# Patient Record
(demographics unavailable — no encounter records)

---

## 2025-05-21 NOTE — HISTORY OF PRESENT ILLNESS
[Chills] : no chills [Constipation] : no constipation [Diarrhea] : no diarrhea [Dysuria] : no dysuria [Fever] : no fever [Nausea] : no nausea [Vomiting] : no vomiting [Erythema] : not erythematous [Discharge] : absent of discharge [Dehiscence] : not dehisced [de-identified] : Patient presents today for F/U S/P [Left TKR done 3 weeks ago. Patient is doing well and undergoing P.T. with improvement. Takes pain meds and DVT prophylaxis. I went over post-op care and answered all questions. I also provided patient with surgical report for their records. [de-identified] : ROM 0-95 degrees [de-identified] : Continue P.T. pain management and DVT prophylaxis. F/U in 1 month with x-rays.

## 2025-05-21 NOTE — PROCEDURE
[de-identified] : Observation on incision dry, clean, intact, well healed. Method staple removing kit. Suture site Cleaned with iodine swab after sutures are completely removed. Instructions Keep incision dry and clean, allowed to shower and pat site dry, do not rub dry, contact office is site becomes red, swollen, infected, or you develop a fever.

## 2025-06-18 NOTE — HISTORY OF PRESENT ILLNESS
[de-identified] : REENA SHEEHAN  62 year old male presents for evaluation of 7 week s/p left TKR. He continues to do PT 2x a week and goes to the gym 4x a week. He has returned to work and does experience swelling, tightness, and warmth of the knee and does ice it. The pt does have question regarding his limitation at the gym for leg press, squats, etc. He will be travelling to [place] and was instructed on DVT prophylaxis and inquiries about going to the beach and water as well as skiing in the winter. The pt does require shoulder replacement later this yr. His preop ROM was 5-100.

## 2025-06-18 NOTE — DISCUSSION/SUMMARY
[de-identified] : Patient is doing well, is happy, and making good progress following their s/p Left TKR. He was stiff preoperatively but has regained the motion and is improving. I reviewed x-rays with them. I have reassured them that their implants are functioning well. All questions answered, understanding verbalized.   He is encouraged to continue with PT and activities as tolerated.   I will see them back in 6-8 weeks.

## 2025-06-18 NOTE — PHYSICAL EXAM
[de-identified] : General appearance: well-nourished and hydrated, pleasant, alert and oriented x 3, cooperative. HEENT: Normocephalic, EOM intact, Nasal septum midline, Oral cavity clear, External auditory canal clear. Cardiovascular: no apparent abnormalities, no lower leg edema, no varicosities, pedal pulses are palpable. Lymphatics Lymph nodes: none palpated, Lymphedema: not present. Neurologic: sensation is normal, no muscle weakness in upper or lower extremities, patella tendon reflexes intact. Dermatologic no apparent skin lesions, moist, warm, no rash. Spine: cervical spine appears normal and moves freely, thoracic spine appears normal and moves freely, lumbosacral spine appears normal and moves freely. Gait: nonantalgic.  Left Knee Inspection: no effusion Wounds: healed midline incision  Alignment: normal. Palpation: no specific tenderness on palpation. ROM: Active (in degrees): 0-100 with no instability. Ligamentous laxity (neg): negative ant. drawer test, negative post. drawer test, stable to varus stress test, stable to valgus stress test, Patellofemoral Alignment Test: Q angle-, normal. Muscle Test: good quad strength. Leg examination: calf is soft and non-tender.   [de-identified] : Left knee x-ray, AP, lateral, merchant view taken at the office today demonstrates a total knee replacement in satisfactory position and alignment. No evidence of loosening. The patella sits in a central position. Femoral screw from prior ACL reconstruction noted.  Right knee x-ray merchant view taken at the office today demonstrates joint space narrowing and a well centered patella.

## 2025-07-30 NOTE — DISCUSSION/SUMMARY
[de-identified] : Patient is doing well and happy following their s/p Left TKR. I reviewed x-rays with them and compared to prior films. I have reassured them that their implants are functioning well. All questions answered, understanding verbalized. KOOS Jr scores obtained and reviewed.   He is encouraged to stay active with home exercises and maintains an active lifestyle. I did refer him to Dr. Viera for evaluation of his shoulder.    I will see them back in 3 months.

## 2025-07-30 NOTE — PHYSICAL EXAM
[de-identified] : General appearance: well-nourished and hydrated, pleasant, alert and oriented x 3, cooperative. HEENT: Normocephalic, EOM intact, Nasal septum midline, Oral cavity clear, External auditory canal clear. Cardiovascular: no apparent abnormalities, no lower leg edema, no varicosities, pedal pulses are palpable. Lymphatics Lymph nodes: none palpated, Lymphedema: not present. Neurologic: sensation is normal, no muscle weakness in upper or lower extremities, patella tendon reflexes intact. Dermatologic no apparent skin lesions, moist, warm, no rash. Spine: cervical spine appears normal and moves freely, thoracic spine appears normal and moves freely, lumbosacral spine appears normal and moves freely. Gait: nonantalgic.  Left Knee Inspection: mild soft tissue swelling Wounds: healed midline incision  Alignment: normal. Palpation: no specific tenderness on palpation. ROM: Active (in degrees): 0-125 with no instability. Ligamentous laxity (neg): negative ant. drawer test, negative post. drawer test, stable to varus stress test, stable to valgus stress test, Patellofemoral Alignment Test: Q angle-, normal. Muscle Test: good quad strength. Leg examination: calf is soft and non-tender. [de-identified] : Left knee x-ray, AP, lateral, merchant view taken at the office today demonstrates a total knee replacement in satisfactory position and alignment. No evidence of loosening. The patella sits in a central position. Femoral screw from prior ACL reconstruction noted.  Right knee x-ray merchant view taken at the office today demonstrates joint space narrowing and a well centered patella.

## 2025-07-30 NOTE — HISTORY OF PRESENT ILLNESS
[de-identified] : REENA SHEEHAN 62 year old male presents for evaluation 3 months s/p left TKR. He is doing well and doing his own home exercises including biking for which he bikes 30 miles. He does note some discomfort and takes Advil as needed. He is experiencing stiffness. He was stiff pre-operatively 5-100.

## 2025-07-30 NOTE — ADDENDUM
[FreeTextEntry1] : This note was written by Ashlee Allred on 07/30/2025 acting as scribe for Dr. Jaden Santos M.D.   I, Dr. Jaden Santos, have read and attest that all the information, medical decision making and discharge instructions within are true and accurate.